# Patient Record
Sex: MALE | Race: OTHER | HISPANIC OR LATINO | URBAN - NONMETROPOLITAN AREA
[De-identification: names, ages, dates, MRNs, and addresses within clinical notes are randomized per-mention and may not be internally consistent; named-entity substitution may affect disease eponyms.]

---

## 2023-09-05 ENCOUNTER — APPOINTMENT (EMERGENCY)
Dept: RADIOLOGY | Facility: HOSPITAL | Age: 53
End: 2023-09-05
Payer: COMMERCIAL

## 2023-09-05 ENCOUNTER — OFFICE VISIT (OUTPATIENT)
Dept: URGENT CARE | Facility: CLINIC | Age: 53
End: 2023-09-05
Payer: COMMERCIAL

## 2023-09-05 ENCOUNTER — APPOINTMENT (EMERGENCY)
Dept: CT IMAGING | Facility: HOSPITAL | Age: 53
End: 2023-09-05
Payer: COMMERCIAL

## 2023-09-05 ENCOUNTER — HOSPITAL ENCOUNTER (EMERGENCY)
Facility: HOSPITAL | Age: 53
Discharge: HOME | End: 2023-09-05
Attending: EMERGENCY MEDICINE | Admitting: EMERGENCY MEDICINE
Payer: COMMERCIAL

## 2023-09-05 VITALS
OXYGEN SATURATION: 97 % | TEMPERATURE: 97.3 F | DIASTOLIC BLOOD PRESSURE: 74 MMHG | HEART RATE: 96 BPM | SYSTOLIC BLOOD PRESSURE: 138 MMHG | RESPIRATION RATE: 16 BRPM

## 2023-09-05 VITALS
RESPIRATION RATE: 20 BRPM | OXYGEN SATURATION: 96 % | WEIGHT: 225 LBS | HEART RATE: 102 BPM | BODY MASS INDEX: 35.31 KG/M2 | HEIGHT: 67 IN | TEMPERATURE: 99.5 F | DIASTOLIC BLOOD PRESSURE: 71 MMHG | SYSTOLIC BLOOD PRESSURE: 140 MMHG

## 2023-09-05 DIAGNOSIS — R42 DIZZINESS: Primary | ICD-10-CM

## 2023-09-05 DIAGNOSIS — R42 VERTIGO: ICD-10-CM

## 2023-09-05 DIAGNOSIS — R55 NEAR SYNCOPE: Primary | ICD-10-CM

## 2023-09-05 LAB
2HR DELTA HS TROPONIN: 1 NG/L
ALBUMIN SERPL BCP-MCNC: 4.3 G/DL (ref 3.5–5)
ALP SERPL-CCNC: 101 U/L (ref 34–104)
ALT SERPL W P-5'-P-CCNC: 40 U/L (ref 7–52)
ANION GAP SERPL CALCULATED.3IONS-SCNC: 11 MMOL/L
APTT PPP: 25 SECONDS (ref 23–37)
AST SERPL W P-5'-P-CCNC: 33 U/L (ref 13–39)
ATRIAL RATE: 104 BPM
ATRIAL RATE: 88 BPM
BASOPHILS # BLD AUTO: 0.02 THOUSANDS/ÂΜL (ref 0–0.1)
BASOPHILS NFR BLD AUTO: 0 % (ref 0–1)
BILIRUB SERPL-MCNC: 0.54 MG/DL (ref 0.2–1)
BNP SERPL-MCNC: 15 PG/ML (ref 0–100)
BUN SERPL-MCNC: 14 MG/DL (ref 5–25)
CALCIUM SERPL-MCNC: 9.5 MG/DL (ref 8.4–10.2)
CARDIAC TROPONIN I PNL SERPL HS: 2 NG/L
CARDIAC TROPONIN I PNL SERPL HS: 3 NG/L
CHLORIDE SERPL-SCNC: 102 MMOL/L (ref 96–108)
CO2 SERPL-SCNC: 25 MMOL/L (ref 21–32)
CREAT SERPL-MCNC: 0.55 MG/DL (ref 0.6–1.3)
EOSINOPHIL # BLD AUTO: 0.06 THOUSAND/ÂΜL (ref 0–0.61)
EOSINOPHIL NFR BLD AUTO: 1 % (ref 0–6)
ERYTHROCYTE [DISTWIDTH] IN BLOOD BY AUTOMATED COUNT: 14.2 % (ref 11.6–15.1)
GFR SERPL CREATININE-BSD FRML MDRD: 118 ML/MIN/1.73SQ M
GLUCOSE SERPL-MCNC: 175 MG/DL (ref 65–140)
GLUCOSE SERPL-MCNC: 176 MG/DL (ref 65–140)
HCT VFR BLD AUTO: 48.5 % (ref 36.5–49.3)
HGB BLD-MCNC: 15.8 G/DL (ref 12–17)
IMM GRANULOCYTES # BLD AUTO: 0.02 THOUSAND/UL (ref 0–0.2)
IMM GRANULOCYTES NFR BLD AUTO: 0 % (ref 0–2)
INR PPP: 0.86 (ref 0.84–1.19)
LYMPHOCYTES # BLD AUTO: 2.29 THOUSANDS/ÂΜL (ref 0.6–4.47)
LYMPHOCYTES NFR BLD AUTO: 33 % (ref 14–44)
MCH RBC QN AUTO: 27.1 PG (ref 26.8–34.3)
MCHC RBC AUTO-ENTMCNC: 32.6 G/DL (ref 31.4–37.4)
MCV RBC AUTO: 83 FL (ref 82–98)
MONOCYTES # BLD AUTO: 0.37 THOUSAND/ÂΜL (ref 0.17–1.22)
MONOCYTES NFR BLD AUTO: 5 % (ref 4–12)
NEUTROPHILS # BLD AUTO: 4.23 THOUSANDS/ÂΜL (ref 1.85–7.62)
NEUTS SEG NFR BLD AUTO: 61 % (ref 43–75)
NRBC BLD AUTO-RTO: 0 /100 WBCS
P AXIS: 16 DEGREES
P AXIS: 7 DEGREES
PLATELET # BLD AUTO: 181 THOUSANDS/UL (ref 149–390)
PMV BLD AUTO: 9.6 FL (ref 8.9–12.7)
POTASSIUM SERPL-SCNC: 3.6 MMOL/L (ref 3.5–5.3)
PR INTERVAL: 150 MS
PR INTERVAL: 152 MS
PROT SERPL-MCNC: 7.8 G/DL (ref 6.4–8.4)
PROTHROMBIN TIME: 12 SECONDS (ref 11.6–14.5)
QRS AXIS: 7 DEGREES
QRS AXIS: 9 DEGREES
QRSD INTERVAL: 102 MS
QRSD INTERVAL: 110 MS
QT INTERVAL: 358 MS
QT INTERVAL: 368 MS
QTC INTERVAL: 445 MS
QTC INTERVAL: 470 MS
RBC # BLD AUTO: 5.84 MILLION/UL (ref 3.88–5.62)
SARS-COV-2 AG UPPER RESP QL IA: NEGATIVE
SL AMB POCT GLUCOSE BLD: 175
SODIUM SERPL-SCNC: 138 MMOL/L (ref 135–147)
T WAVE AXIS: 0 DEGREES
T WAVE AXIS: 8 DEGREES
VALID CONTROL: NORMAL
VENTRICULAR RATE: 104 BPM
VENTRICULAR RATE: 88 BPM
WBC # BLD AUTO: 6.99 THOUSAND/UL (ref 4.31–10.16)

## 2023-09-05 PROCEDURE — 80053 COMPREHEN METABOLIC PANEL: CPT | Performed by: EMERGENCY MEDICINE

## 2023-09-05 PROCEDURE — 85610 PROTHROMBIN TIME: CPT | Performed by: EMERGENCY MEDICINE

## 2023-09-05 PROCEDURE — 36415 COLL VENOUS BLD VENIPUNCTURE: CPT | Performed by: EMERGENCY MEDICINE

## 2023-09-05 PROCEDURE — 99285 EMERGENCY DEPT VISIT HI MDM: CPT | Performed by: EMERGENCY MEDICINE

## 2023-09-05 PROCEDURE — 84484 ASSAY OF TROPONIN QUANT: CPT | Performed by: EMERGENCY MEDICINE

## 2023-09-05 PROCEDURE — 83880 ASSAY OF NATRIURETIC PEPTIDE: CPT | Performed by: EMERGENCY MEDICINE

## 2023-09-05 PROCEDURE — 96361 HYDRATE IV INFUSION ADD-ON: CPT

## 2023-09-05 PROCEDURE — 85730 THROMBOPLASTIN TIME PARTIAL: CPT | Performed by: EMERGENCY MEDICINE

## 2023-09-05 PROCEDURE — S9083 URGENT CARE CENTER GLOBAL: HCPCS

## 2023-09-05 PROCEDURE — 99284 EMERGENCY DEPT VISIT MOD MDM: CPT

## 2023-09-05 PROCEDURE — 87811 SARS-COV-2 COVID19 W/OPTIC: CPT

## 2023-09-05 PROCEDURE — G0382 LEV 3 HOSP TYPE B ED VISIT: HCPCS

## 2023-09-05 PROCEDURE — 96374 THER/PROPH/DIAG INJ IV PUSH: CPT

## 2023-09-05 PROCEDURE — 85025 COMPLETE CBC W/AUTO DIFF WBC: CPT | Performed by: EMERGENCY MEDICINE

## 2023-09-05 PROCEDURE — 93010 ELECTROCARDIOGRAM REPORT: CPT | Performed by: INTERNAL MEDICINE

## 2023-09-05 PROCEDURE — 71045 X-RAY EXAM CHEST 1 VIEW: CPT

## 2023-09-05 PROCEDURE — 82948 REAGENT STRIP/BLOOD GLUCOSE: CPT

## 2023-09-05 PROCEDURE — 70450 CT HEAD/BRAIN W/O DYE: CPT

## 2023-09-05 PROCEDURE — 93005 ELECTROCARDIOGRAM TRACING: CPT

## 2023-09-05 PROCEDURE — 99283 EMERGENCY DEPT VISIT LOW MDM: CPT

## 2023-09-05 PROCEDURE — G1004 CDSM NDSC: HCPCS

## 2023-09-05 RX ORDER — ONDANSETRON 4 MG/1
4 TABLET, FILM COATED ORAL EVERY 6 HOURS PRN
Qty: 10 TABLET | Refills: 0 | Status: SHIPPED | OUTPATIENT
Start: 2023-09-05

## 2023-09-05 RX ORDER — ATORVASTATIN CALCIUM 80 MG/1
TABLET, FILM COATED ORAL
COMMUNITY
Start: 2023-07-15

## 2023-09-05 RX ORDER — LOSARTAN POTASSIUM 100 MG/1
TABLET ORAL
COMMUNITY
Start: 2023-07-17

## 2023-09-05 RX ORDER — ONDANSETRON 2 MG/ML
4 INJECTION INTRAMUSCULAR; INTRAVENOUS ONCE
Status: COMPLETED | OUTPATIENT
Start: 2023-09-05 | End: 2023-09-05

## 2023-09-05 RX ORDER — MECLIZINE HYDROCHLORIDE 25 MG/1
25 TABLET ORAL 3 TIMES DAILY PRN
Qty: 15 TABLET | Refills: 0 | Status: SHIPPED | OUTPATIENT
Start: 2023-09-05

## 2023-09-05 RX ORDER — NAPROXEN 375 MG/1
TABLET ORAL
COMMUNITY
Start: 2023-08-30

## 2023-09-05 RX ORDER — INSULIN GLARGINE 100 [IU]/ML
INJECTION, SOLUTION SUBCUTANEOUS
COMMUNITY
Start: 2023-07-17

## 2023-09-05 RX ORDER — CYCLOBENZAPRINE HCL 5 MG
TABLET ORAL
COMMUNITY
Start: 2023-07-17

## 2023-09-05 RX ADMIN — SODIUM CHLORIDE 1000 ML: 0.9 INJECTION, SOLUTION INTRAVENOUS at 11:35

## 2023-09-05 RX ADMIN — ONDANSETRON 4 MG: 2 INJECTION INTRAMUSCULAR; INTRAVENOUS at 11:35

## 2023-09-05 NOTE — PROGRESS NOTES
[facility]        NAME: Maria Eugenia Jimenez is a 47 y.o. male  : 1970    MRN: [id number]  DATE: 2023  TIME: 10:10 AM    Assessment and Plan   Near syncope [R55]  1. Near syncope  POCT blood glucose    ECG 12 lead    Poct Covid 19 Rapid Antigen Test    Transfer to other facility        . EKG showing NSR without ST depression or elevation, sinus arrhythmia. Rapid POC COVID testing negative. Requires higher level of care and recommend he proceed to ED for further evaluation and management of symptoms. Patient agreeable. Cannot drive and so ambulance called for transport. Complete assessment deferred to ED. Patient Instructions     Patient Instructions     Follow up with PCP in 3-5 days. Proceed to ER for further evaluation and management of symptoms  Do not drive     Near Syncope   AMBULATORY CARE:   Near syncope,  also called presyncope, is the feeling that you may faint (lose consciousness), but you do not. You can control some health conditions that cause near syncope. Your healthcare providers can help you create a plan to manage near syncope and prevent episodes. Signs and symptoms that may occur before a near syncope episode:   • Feeling dizzy or lightheaded    • Nausea, feeling warm, sweating, or clammy skin    • Blurred vision, or vision that is blacking out    • Confusion    • Trouble breathing    • A fast or fluttering heartbeat, chest pain, or a weak pulse    Seek care immediately if:   • You have sudden chest pain. • You have trouble breathing or shortness of breath. • You have vision changes, are sweating, and have nausea while you are sitting or lying down. • You feel dizzy or flushed and your heart is fluttering. • You lose consciousness. • You cannot use your arm, hand, foot, or leg, or it feels weak. • You have trouble speaking or understanding others when they speak.     Contact your healthcare provider if:   • You have new or worsening symptoms. • Your heart beats faster or slower than usual.     • You have questions or concerns about your condition or care. Treatment  depends on the cause of your near syncope. You may  need any of the following:  • Medicines  may be needed to help your heart pump strongly and regularly. Your healthcare provider may also make changes to any medicines that are causing syncope. • Tilt training  involves training yourself to stand for 10 to 30 minutes each day against a wall. This helps your body decrease the effects of posture changes and reduces the number of fainting spells. Manage near syncope:   • Sit or lie down when needed. This includes when you feel dizzy, your throat is getting tight, and your vision changes. Raise your legs above the level of your heart. • Take slow, deep breaths if you start to breathe faster with anxiety or fear. This can help decrease dizziness and the feeling that you might faint. • Keep a record of your near syncope episodes. Include your symptoms and your activity before and after the episode. The record can help your healthcare provider find the cause of your near syncope and help you manage episodes. Prevent a near syncope episode:   • Move slowly and let yourself get used to one position before you move to another position. This is very important when you change from a lying or sitting position to a standing position. Take some deep breaths before you stand up from a lying position. Stand up slowly. Sudden movements may cause a fainting spell. Sit on the side of the bed or couch for a few minutes before you stand up. If you are on bedrest, try to be upright for about 2 hours each day, or as directed. Do not lock your legs if you are standing for a long period of time. Move your legs and bend your knees to keep blood flowing. • Follow your healthcare provider's recommendations.   Your provider may  recommend that you drink more liquids to prevent dehydration. You may also need to have more salt to keep your blood pressure from dropping too low and causing syncope. Your provider will tell you how much liquid and sodium to have each day. He or she will also tell you how much physical activity is safe for you. This will depend on what is causing your near syncope. • Watch for signs of low blood sugar. These include hunger, nervousness, sweating, and fast or fluttery heartbeats. Talk with your healthcare provider about ways to keep your blood sugar level steady. • Check your blood pressure often. This is important if you take medicine to lower your blood pressure. Check your blood pressure when you are lying down and when you are standing. Ask how often to check during the day. Keep a record of your blood pressure numbers. Your healthcare provider may use the record to help plan your treatment. • Do not strain if you are constipated. You may faint if you strain to have a bowel movement. Walking is the best way to get your bowels moving. Eat foods high in fiber to make it easier to have a bowel movement. Good examples are high-fiber cereals, beans, vegetables, and whole-grain breads. Prune juice may help make bowel movements softer. • Do not exercise outside on a hot day. The combination of physical activity and heat can lead to dehydration. This can cause syncope. Follow up with your healthcare provider as directed: You may need more tests to help find the cause of your near syncope episodes. The tests will help healthcare providers plan the best treatment for you. Write down your questions so you remember to ask them during your visits. © Copyright [employer] 2022 Information is for End User's use only and may not be sold, redistributed or otherwise used for commercial purposes. The above information is an  only. It is not intended as medical advice for individual conditions or treatments.  Talk to your doctor, nurse or pharmacist before following any medical regimen to see if it is safe and effective for you. Chief Complaint     Chief Complaint   Patient presents with   • Dizziness     And neck pain starting yesterday; diaphoretic and abdominal cramping starting this AM; pt reporting dry heaving starting about 30 minutes         History of Present Illness       50-year-old male with a past medical history significant for hypertension and type II DM presents with complaints of near syncope. Patient reporting dizziness and lightheadedness associated with abdominal cramping and nausea. States dry heaves but denies vomiting or diarrhea. No fever, chills, or URI symptoms. He did eat a sandwich earlier today. Denies chest pain or shortness of breath. Admits to feeling sweaty. He is a . Does not check his blood glucose. Denies any known sick contacts or exposures. Dizziness  Associated symptoms include abdominal pain, headaches and nausea. Pertinent negatives include no arthralgias, chest pain, chills, congestion, coughing, fever, myalgias, numbness, rash, sore throat, vomiting or weakness. Review of Systems   Review of Systems   Constitutional: Negative for chills and fever. HENT: Negative for congestion, ear discharge, ear pain, rhinorrhea, sore throat, trouble swallowing and voice change. Eyes: Negative for discharge. Respiratory: Negative for cough and shortness of breath. Cardiovascular: Negative for chest pain. Gastrointestinal: Positive for abdominal pain and nausea. Negative for diarrhea and vomiting. Musculoskeletal: Negative for arthralgias and myalgias. Skin: Negative for rash. Neurological: Positive for dizziness, light-headedness and headaches. Negative for syncope, weakness and numbness.          Current Medications       Current Outpatient Medications:   •  atorvastatin (LIPITOR) 80 mg tablet, , Disp: , Rfl:   •  cyclobenzaprine (FLEXERIL) 5 mg tablet, , Disp: , Rfl:   •  Lantus SoloStar 100 units/mL SOPN, , Disp: , Rfl:   •  losartan (COZAAR) 100 MG tablet, , Disp: , Rfl:   •  naproxen (NAPROSYN) 375 mg tablet, , Disp: , Rfl:     Current Allergies     Allergies as of 09/05/2023   • (No Known Allergies)            The following portions of the patient's history were reviewed and updated as appropriate: allergies, current medications, past family history, past medical history, past social history, past surgical history and problem list.     Past Medical History:   Diagnosis Date   • Diabetes mellitus ([address])    • High cholesterol    • Hypertension        Past Surgical History:   Procedure Laterality Date   • HAND SURGERY     • HERNIA REPAIR         History reviewed. No pertinent family history. Medications have been verified. Objective   /71   Pulse 102   Temp 99.5 °F (37.5 °C)   Resp 20   Ht 5' 7" (1.702 m)   Wt 102 kg (225 lb)   SpO2 96%   BMI 35.24 kg/m²   No LMP for male patient. Physical Exam     Physical Exam  Vitals and nursing note reviewed. Constitutional:       General: He is not in acute distress. Appearance: He is ill-appearing and diaphoretic. He is not toxic-appearing. HENT:      Head: Normocephalic. Right Ear: Ear canal and external ear normal.      Left Ear: Ear canal and external ear normal.      Nose: Nose normal.      Mouth/Throat:      Mouth: Mucous membranes are moist.      Pharynx: Oropharynx is clear. Eyes:      Extraocular Movements: Extraocular movements intact. Conjunctiva/sclera: Conjunctivae normal.      Pupils: Pupils are equal, round, and reactive to light. Cardiovascular:      Rate and Rhythm: Regular rhythm. Tachycardia present. Heart sounds: Normal heart sounds. Pulmonary:      Effort: Pulmonary effort is normal. No respiratory distress. Breath sounds: Normal breath sounds. No stridor. No wheezing, rhonchi or rales.    Abdominal:      General: Bowel sounds are normal. Palpations: Abdomen is soft. Tenderness: There is abdominal tenderness in the left upper quadrant. Musculoskeletal:      Cervical back: Normal range of motion. No tenderness. Skin:     General: Skin is warm. Neurological:      Mental Status: He is alert and oriented to person, place, and time. GCS: GCS eye subscore is 4. GCS verbal subscore is 5. GCS motor subscore is 6. Sensory: Sensation is intact. Motor: Motor function is intact. Gait: Gait is intact.    Psychiatric:         Mood and Affect: Mood normal.         Behavior: Behavior normal.

## 2023-09-05 NOTE — ED PROVIDER NOTES
History  Chief Complaint   Patient presents with   • Dizziness     Abd pain since yesterday c/o worsening pain wit nausea, dizziness and being sweaty. Sent from urgent care for further eval       50-year-old male via EMS from urgent care describes spinning dizziness with nausea, nausea and some abdominal discomfort, was near syncopal. He notes associated headache. Symptoms began yesterday. History of vertigo. Notes he feels a little off on his feet. No injury. No viral prodrome, fever or chills. No chest pain or dyspnea. Denies history of CAD, VTE and CVA. History provided by:  Patient  Dizziness  Quality:  Head spinning and lightheadedness  Onset quality:  Unable to specify  Timing:  Intermittent  Progression:  Waxing and waning  Chronicity:  New  Context: physical activity    Relieved by:  None tried  Worsened by: Movement  Ineffective treatments:  None tried  Associated symptoms: headaches and nausea    Associated symptoms: no chest pain, no hearing loss, no palpitations, no shortness of breath, no tinnitus and no vision changes    Risk factors: hx of vertigo    Risk factors: no heart disease and no hx of stroke        Prior to Admission Medications   Prescriptions Last Dose Informant Patient Reported? Taking? Lantus SoloStar 100 units/mL SOPN   Yes No   atorvastatin (LIPITOR) 80 mg tablet   Yes No   cyclobenzaprine (FLEXERIL) 5 mg tablet   Yes No   losartan (COZAAR) 100 MG tablet   Yes No   naproxen (NAPROSYN) 375 mg tablet   Yes No      Facility-Administered Medications: None       Past Medical History:   Diagnosis Date   • Diabetes mellitus ([address])    • High cholesterol    • Hypertension        Past Surgical History:   Procedure Laterality Date   • HAND SURGERY     • HERNIA REPAIR         History reviewed. No pertinent family history. I have reviewed and agree with the history as documented.     E-Cigarette/Vaping   • E-Cigarette Use Never User      E-Cigarette/Vaping Substances     Social History Tobacco Use   • Smoking status: Never   • Smokeless tobacco: Never   Vaping Use   • Vaping Use: Never used   Substance Use Topics   • Alcohol use: Not Currently   • Drug use: Not Currently       Review of Systems   HENT: Negative for hearing loss and tinnitus. Respiratory: Negative for shortness of breath. Cardiovascular: Negative for chest pain and palpitations. Gastrointestinal: Positive for nausea. Neurological: Positive for dizziness and headaches. All other systems reviewed and are negative. Physical Exam  Physical Exam  Vitals and nursing note reviewed. Constitutional:       Comments: Pleasant, comfortable-appearing   HENT:      Head: Normocephalic and atraumatic. Right Ear: Tympanic membrane normal.      Left Ear: Tympanic membrane normal.      Mouth/Throat:      Mouth: Mucous membranes are moist.      Pharynx: Oropharynx is clear. Eyes:      Conjunctiva/sclera: Conjunctivae normal.      Pupils: Pupils are equal, round, and reactive to light. Comments: Horizontal nystagmus appears fast left, fatigues   Cardiovascular:      Rate and Rhythm: Normal rate and regular rhythm. Heart sounds: Normal heart sounds. Pulmonary:      Effort: Pulmonary effort is normal.      Breath sounds: Normal breath sounds. Abdominal:      General: Bowel sounds are normal. There is no distension. Palpations: Abdomen is soft. Tenderness: There is no abdominal tenderness. Musculoskeletal:         General: No deformity. Cervical back: Neck supple. No rigidity. Skin:     General: Skin is warm and dry. Neurological:      General: No focal deficit present. Mental Status: He is alert and oriented to person, place, and time. Cranial Nerves: No cranial nerve deficit. Sensory: No sensory deficit. Motor: No weakness.       Coordination: Coordination normal.      Gait: Gait normal.      Comments: Ambulates around room without difficulty or imbalance   Psychiatric: Behavior: Behavior normal.         Thought Content:  Thought content normal.         Judgment: Judgment normal.         Vital Signs  ED Triage Vitals [09/05/23 1046]   Temperature Pulse Respirations Blood Pressure SpO2   (!) 97.3 °F (36.3 °C) 104 18 149/72 97 %      Temp Source Heart Rate Source Patient Position - Orthostatic VS BP Location FiO2 (%)   Temporal Monitor Lying Left arm --      Pain Score       8           Vitals:    09/05/23 1046 09/05/23 1341   BP: 149/72 138/74   Pulse: 104 96   Patient Position - Orthostatic VS: Lying Lying         Visual Acuity  Visual Acuity    Flowsheet Row Most Recent Value   L Pupil Size (mm) 3   R Pupil Size (mm) 3          ED Medications  Medications   sodium chloride 0.9 % bolus 1,000 mL (0 mL Intravenous Stopped 9/5/23 1336)   ondansetron (ZOFRAN) injection 4 mg (4 mg Intravenous Given 9/5/23 1135)       Diagnostic Studies  Results Reviewed     Procedure Component Value Units Date/Time    HS Troponin I 2hr [[id number]]  (Normal) Collected: 09/05/23 1336    Lab Status: Final result Specimen: Blood from Arm, Right Updated: 09/05/23 1423     hs TnI 2hr 3 ng/L      Delta 2hr hsTnI 1 ng/L     B-Type Natriuretic Peptide(BNP) [[id number]]  (Normal) Collected: 09/05/23 1132    Lab Status: Final result Specimen: Blood from Arm, Right Updated: 09/05/23 1209     BNP 15 pg/mL     HS Troponin 0hr (reflex protocol) [[id number]]  (Normal) Collected: 09/05/23 1132    Lab Status: Final result Specimen: Blood from Arm, Right Updated: 09/05/23 1203     hs TnI 0hr 2 ng/L     Comprehensive metabolic panel [[id number]]  (Abnormal) Collected: 09/05/23 1132    Lab Status: Final result Specimen: Blood from Arm, Right Updated: 09/05/23 1154     Sodium 138 mmol/L      Potassium 3.6 mmol/L      Chloride 102 mmol/L      CO2 25 mmol/L      ANION GAP 11 mmol/L      BUN 14 mg/dL      Creatinine 0.55 mg/dL      Glucose 176 mg/dL      Calcium 9.5 mg/dL      AST 33 U/L      ALT 40 U/L      Alkaline Phosphatase 101 U/L      Total Protein 7.8 g/dL      Albumin 4.3 g/dL      Total Bilirubin 0.54 mg/dL      eGFR 118 ml/min/1.73sq m     Narrative:      [place] guidelines for Chronic Kidney Disease (CKD):   •  Stage 1 with normal or high GFR (GFR > 90 mL/min/1.73 square meters)  •  Stage 2 Mild CKD (GFR = 60-89 mL/min/1.73 square meters)  •  Stage 3A Moderate CKD (GFR = 45-59 mL/min/1.73 square meters)  •  Stage 3B Moderate CKD (GFR = 30-44 mL/min/1.73 square meters)  •  Stage 4 Severe CKD (GFR = 15-29 mL/min/1.73 square meters)  •  Stage 5 End Stage CKD (GFR <15 mL/min/1.73 square meters)  Note: GFR calculation is accurate only with a steady state creatinine    Protime-INR [[id number]]  (Normal) Collected: 09/05/23 1132    Lab Status: Final result Specimen: Blood from Arm, Right Updated: 09/05/23 1153     Protime 12.0 seconds      INR 0.86    APTT [[id number]]  (Normal) Collected: 09/05/23 1132    Lab Status: Final result Specimen: Blood from Arm, Right Updated: 09/05/23 1153     PTT 25 seconds     CBC and differential [[id number]]  (Abnormal) Collected: 09/05/23 1132    Lab Status: Final result Specimen: Blood from Arm, Right Updated: 09/05/23 1137     WBC 6.99 Thousand/uL      RBC 5.84 Million/uL      Hemoglobin 15.8 g/dL      Hematocrit 48.5 %      MCV 83 fL      MCH 27.1 pg      MCHC 32.6 g/dL      RDW 14.2 %      MPV 9.6 fL      Platelets 442 Thousands/uL      nRBC 0 /100 WBCs      Neutrophils Relative 61 %      Immat GRANS % 0 %      Lymphocytes Relative 33 %      Monocytes Relative 5 %      Eosinophils Relative 1 %      Basophils Relative 0 %      Neutrophils Absolute 4.23 Thousands/µL      Immature Grans Absolute 0.02 Thousand/uL      Lymphocytes Absolute 2.29 Thousands/µL      Monocytes Absolute 0.37 Thousand/µL      Eosinophils Absolute 0.06 Thousand/µL      Basophils Absolute 0.02 Thousands/µL                  XR chest portable   ED Interpretation by Rosana Red DO (09/05 1334)   No infiltrate or cardiomegaly      Final Result by Gen Philippe MD (09/05 1608)      No acute consolidation or congestion                  Workstation performed: [id number]         CT head without contrast   Final Result by Fernando Ledesma MD (09/05 1255)      No acute intracranial abnormality. Workstation performed: [id number]                    Procedures  Procedures         ED Course  ED Course as of 09/06/23 0614   Tue Sep 05, 2023   1108 EKG 10:50 AM sinus tachycardia rate 104 normal axis normal intervals inferior T wave inversions inferiorly, age indeterminant inferior wall Q waves interpreted by me   1333 hs TnI 0hr: 2   1333 BNP: 15   1333 WBC: 6.99   1535 Patient states he is feeling much better. We discussed results and care going forward. He is agreeable with close outpatient follow-up with his clinician and will call today to arrange within the next few days. Notes he will seek emergent care for any worsening or additional symptoms. Medical Decision Making  Dizziness: acute illness or injury  Vertigo: acute illness or injury  Amount and/or Complexity of Data Reviewed  Labs: ordered. Decision-making details documented in ED Course. Radiology: ordered and independent interpretation performed. Decision-making details documented in ED Course. ECG/medicine tests: ordered and independent interpretation performed. Decision-making details documented in ED Course. Risk  Prescription drug management.           Disposition  Final diagnoses:   Dizziness   Vertigo     Time reflects when diagnosis was documented in both MDM as applicable and the Disposition within this note     Time User Action Codes Description Comment    9/5/2023  1:45 PM Fanta Love Add [R42] Dizziness     9/5/2023  1:45 PM Fanta Love Add [R42] Vertigo       ED Disposition     ED Disposition   Discharge    Condition   Stable    Date/Time   Tue Sep 5, 2023 1:45 PM    Chris Ahn discharge to home/self care. Follow-up Information    None         Discharge Medication List as of 9/5/2023  1:48 PM      START taking these medications    Details   meclizine (ANTIVERT) 25 mg tablet Take 1 tablet (25 mg total) by mouth 3 (three) times a day as needed for dizziness, Starting Tue 9/5/2023, Normal      ondansetron (ZOFRAN) 4 mg tablet Take 1 tablet (4 mg total) by mouth every 6 (six) hours as needed for nausea or vomiting, Starting Tue 9/5/2023, Normal         CONTINUE these medications which have NOT CHANGED    Details   atorvastatin (LIPITOR) 80 mg tablet Starting Sat 7/15/2023, Historical Med      cyclobenzaprine (FLEXERIL) 5 mg tablet Starting Mon 7/17/2023, Historical Med      Lantus SoloStar 100 units/mL SOPN Starting Mon 7/17/2023, Historical Med      losartan (COZAAR) 100 MG tablet Starting Mon 7/17/2023, Historical Med      naproxen (NAPROSYN) 375 mg tablet Starting Wed 8/30/2023, Historical Med             No discharge procedures on file.     PDMP Review     None          ED Provider  Electronically Signed by           Erick León DO  09/06/23 9705

## 2023-09-05 NOTE — ED NOTES
Patient transported to [id number] [place] [address], [address] [place] [address]  09/05/23 5681

## 2023-09-05 NOTE — DISCHARGE INSTRUCTIONS
Please see your physician within 1-2 days, call today for appointment  Return immediately if worse or any new symptoms  Tylenol 1000 mg every 6 hours as needed  and/or  Advil 400 mg every 6 hours as needed  May take both together

## 2023-09-05 NOTE — PATIENT INSTRUCTIONS
Follow up with PCP in 3-5 days. Proceed to ER for further evaluation and management of symptoms  Do not drive     Near Syncope   AMBULATORY CARE:   Near syncope,  also called presyncope, is the feeling that you may faint (lose consciousness), but you do not. You can control some health conditions that cause near syncope. Your healthcare providers can help you create a plan to manage near syncope and prevent episodes. Signs and symptoms that may occur before a near syncope episode:   Feeling dizzy or lightheaded    Nausea, feeling warm, sweating, or clammy skin    Blurred vision, or vision that is blacking out    Confusion    Trouble breathing    A fast or fluttering heartbeat, chest pain, or a weak pulse    Seek care immediately if:   You have sudden chest pain. You have trouble breathing or shortness of breath. You have vision changes, are sweating, and have nausea while you are sitting or lying down. You feel dizzy or flushed and your heart is fluttering. You lose consciousness. You cannot use your arm, hand, foot, or leg, or it feels weak. You have trouble speaking or understanding others when they speak. Contact your healthcare provider if:   You have new or worsening symptoms. Your heart beats faster or slower than usual.     You have questions or concerns about your condition or care. Treatment  depends on the cause of your near syncope. You may  need any of the following:  Medicines  may be needed to help your heart pump strongly and regularly. Your healthcare provider may also make changes to any medicines that are causing syncope. Tilt training  involves training yourself to stand for 10 to 30 minutes each day against a wall. This helps your body decrease the effects of posture changes and reduces the number of fainting spells. Manage near syncope:   Sit or lie down when needed. This includes when you feel dizzy, your throat is getting tight, and your vision changes. Raise your legs above the level of your heart. Take slow, deep breaths if you start to breathe faster with anxiety or fear. This can help decrease dizziness and the feeling that you might faint. Keep a record of your near syncope episodes. Include your symptoms and your activity before and after the episode. The record can help your healthcare provider find the cause of your near syncope and help you manage episodes. Prevent a near syncope episode: Move slowly and let yourself get used to one position before you move to another position. This is very important when you change from a lying or sitting position to a standing position. Take some deep breaths before you stand up from a lying position. Stand up slowly. Sudden movements may cause a fainting spell. Sit on the side of the bed or couch for a few minutes before you stand up. If you are on bedrest, try to be upright for about 2 hours each day, or as directed. Do not lock your legs if you are standing for a long period of time. Move your legs and bend your knees to keep blood flowing. Follow your healthcare provider's recommendations. Your provider may  recommend that you drink more liquids to prevent dehydration. You may also need to have more salt to keep your blood pressure from dropping too low and causing syncope. Your provider will tell you how much liquid and sodium to have each day. He or she will also tell you how much physical activity is safe for you. This will depend on what is causing your near syncope. Watch for signs of low blood sugar. These include hunger, nervousness, sweating, and fast or fluttery heartbeats. Talk with your healthcare provider about ways to keep your blood sugar level steady. Check your blood pressure often. This is important if you take medicine to lower your blood pressure. Check your blood pressure when you are lying down and when you are standing. Ask how often to check during the day.  Keep a record of your blood pressure numbers. Your healthcare provider may use the record to help plan your treatment. Do not strain if you are constipated. You may faint if you strain to have a bowel movement. Walking is the best way to get your bowels moving. Eat foods high in fiber to make it easier to have a bowel movement. Good examples are high-fiber cereals, beans, vegetables, and whole-grain breads. Prune juice may help make bowel movements softer. Do not exercise outside on a hot day. The combination of physical activity and heat can lead to dehydration. This can cause syncope. Follow up with your healthcare provider as directed: You may need more tests to help find the cause of your near syncope episodes. The tests will help healthcare providers plan the best treatment for you. Write down your questions so you remember to ask them during your visits. © Copyright [employer] 2022 Information is for End User's use only and may not be sold, redistributed or otherwise used for commercial purposes. The above information is an  only. It is not intended as medical advice for individual conditions or treatments. Talk to your doctor, nurse or pharmacist before following any medical regimen to see if it is safe and effective for you.